# Patient Record
Sex: FEMALE | Race: BLACK OR AFRICAN AMERICAN
[De-identification: names, ages, dates, MRNs, and addresses within clinical notes are randomized per-mention and may not be internally consistent; named-entity substitution may affect disease eponyms.]

---

## 2020-06-04 ENCOUNTER — HOSPITAL ENCOUNTER (EMERGENCY)
Dept: HOSPITAL 72 - EMR | Age: 40
Discharge: HOME | End: 2020-06-04
Payer: COMMERCIAL

## 2020-06-04 VITALS — SYSTOLIC BLOOD PRESSURE: 141 MMHG | DIASTOLIC BLOOD PRESSURE: 97 MMHG

## 2020-06-04 VITALS — HEIGHT: 64 IN | WEIGHT: 213 LBS | BODY MASS INDEX: 36.37 KG/M2

## 2020-06-04 VITALS — DIASTOLIC BLOOD PRESSURE: 74 MMHG | SYSTOLIC BLOOD PRESSURE: 131 MMHG

## 2020-06-04 DIAGNOSIS — M76.61: Primary | ICD-10-CM

## 2020-06-04 DIAGNOSIS — J45.909: ICD-10-CM

## 2020-06-04 PROCEDURE — 96372 THER/PROPH/DIAG INJ SC/IM: CPT

## 2020-06-04 PROCEDURE — 73610 X-RAY EXAM OF ANKLE: CPT

## 2020-06-04 PROCEDURE — 99283 EMERGENCY DEPT VISIT LOW MDM: CPT

## 2020-06-04 NOTE — EMERGENCY ROOM REPORT
History of Present Illness


General


Chief Complaint:  Pain


Source:  Patient





Present Illness


HPI


This patient states that for the past month she has noted that her right 

Achilles tendon has been sore.  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 

job and this is the foot she uses to step on the gas.  She does a lot of 

stopping and starting.  She states that this has been ongoing for weeks.  She 

states it is aggravated by movement.  She is also noted some slight swelling 

around the tendon and in the ankle.  She denies injury or trauma.  She denies 

recent illness.  She denies fever or chills.  She denies cough or congestion.  

She has no other joint pain other than some chronic pain in her knees.  She has 

no other complaints.


Allergies:  


Coded Allergies:  


     No Known Allergies (Unverified , 9/23/17)





COVID-19 Screening


Contact w/high risk pt:  No


Recent Travel to affected area:  No


Experienced COVID-19 symptoms?:  No


COVID-19 Testing performed PTA:  No





Patient History


Past Medical History:  see triage record, asthma, other - HLP


Social History:  Denies: smoking, alcohol use, drug use


Last Menstrual Period:  05/11/20


Pregnant Now:  No


Reviewed Nursing Documentation:  PMH: Agreed; PSxH: Agreed





Nursing Documentation-PMH


Past Medical History:  No History, Except For


Hx Cardiac Problems:  No


Hx Asthma:  Yes


Hx Cancer:  No


Hx Gastrointestinal Problems:  No


Hx Neurological Problems:  No





Review of Systems


All Other Systems:  negative except mentioned in HPI





Physical Exam





Vital Signs








  Date Time  Temp Pulse Resp B/P (MAP) Pulse Ox O2 Delivery O2 Flow Rate FiO2


 


6/4/20 09:51 98.8 68 16 141/97 (112) 98 Room Air  








Sp02 EP Interpretation:  reviewed, normal


General Appearance:  no apparent distress, alert, GCS 15, non-toxic


Head:  normocephalic, atraumatic


ENT:  hearing grossly normal, no angioedema, normal voice


Neck:  normal inspection


Respiratory:  no respiratory distress, no retraction, no accessory muscle use, 

speaking full sentences


Rectal:  deferred


Musculoskeletal:  back normal, normal range of motion, other - Antalgic gait.  +

ttp along the R. achilles tendon with associated minimal/mild swelling.  No 

erythema or warmth.


Neurologic:  alert, motor strength/tone normal, oriented x3, sensory intact, 

responsive, speech normal


Psychiatric:  judgement/insight normal, memory normal, mood/affect normal, no 

suicidal/homicidal ideation


Skin:  no rash, normal color





Medical Decision Making


Diagnostic Impression:  


 Primary Impression:  


 Achilles tendinitis


ER Course


This patient has findings of Achilles tendinitis on exam.  The patient has pain 

with palpation of the Achilles tendon.  There is some mild associated swelling.

  There is no erythema or warmth that would make me concerned for a septic 

joint.  The Achilles tendon is intact and not ruptured.  The patient was given 

IM Toradol.  A plain film x-ray was obtained that showed no acute findings of 

the right ankle.  An Ace wrap was placed for comfort.  The patient was educated 

that she would need to follow-up with a podiatrist or orthopedist for further 

evaluation of her Achilles tendinitis.  I did not identify any emergency 

medical condition and do not feel that any further evaluation in the emergency 

department is indicated.  However, I did educate the patient that she would 

need to be seen further by 1 of these specialists as mentioned.  She indicated 

understanding and intention to do so.  She states she can easily follow-up with 

her primary care physician and obtain this referral.  She is given close return 

precautions and follow-up instructions.





I warned the patient that plain x-rays have a significant false-negative rate.  

Factors, significant soft tissue injuries, and other pathology may be present 

even though not seen on x-ray.  Injuries serious enough to ultimately require 

surgery may be present with normal x-rays.  This can occur because some 

fractures or not initially visible on plain film x-rays or, rarely, the 

radiologist may discover a subtle fracture that I missed on my preliminary 

read.  It was explained that close outpatient followup is required to evaluate 

this possibility.  If all symptoms resolve or improve significantly in the 

coming weeks, no further testing is needed.  However, if the pain/symptoms 

persist, additional studies such as MRI/CT or repeat x-ray would be needed to 

rule out the possibility of serious soft tissue injury.  The patient agreed to 

followup as directed.


Other X-Ray Diagnostic Results


Other X-Ray Diagnostic Results :  


   X-Ray ordered:  R. ankle xray


   # of Views/Limited Vs Complete:  Complete


   Indication:  Pain


   EP Interpretation:  Yes


   Interpretation:  no fractures


   Impression:  No acute disease


   Electronically Signed by:  Kiara Gomez DO





Last Vital Signs








  Date Time  Temp Pulse Resp B/P (MAP) Pulse Ox O2 Delivery O2 Flow Rate FiO2


 


6/4/20 09:53 98.8 68 16 141/97 98 Room Air  








Status:  improved


Disposition:  HOME, SELF-CARE


Condition:  Improved


Referrals:  


PREFERRED [facility],REFERRING (PCP)











Kiara Gomez DO Jun 4, 2020 10:55

## 2020-06-04 NOTE — DIAGNOSTIC IMAGING REPORT
Indication: Right ankle pain

 

Technique: 3 views of the right ankle

 

Comparison: none 

 

Findings: No acute fractures. No dislocations. The joint spaces are preserved.

 

Impression: Negative

## 2020-09-02 ENCOUNTER — HOSPITAL ENCOUNTER (EMERGENCY)
Dept: HOSPITAL 72 - EMR | Age: 40
Discharge: HOME | End: 2020-09-02
Payer: COMMERCIAL

## 2020-09-02 VITALS — WEIGHT: 201 LBS | HEIGHT: 64 IN | BODY MASS INDEX: 34.31 KG/M2

## 2020-09-02 VITALS — DIASTOLIC BLOOD PRESSURE: 86 MMHG | SYSTOLIC BLOOD PRESSURE: 130 MMHG

## 2020-09-02 VITALS — SYSTOLIC BLOOD PRESSURE: 130 MMHG | DIASTOLIC BLOOD PRESSURE: 86 MMHG

## 2020-09-02 DIAGNOSIS — M75.22: Primary | ICD-10-CM

## 2020-09-02 DIAGNOSIS — J45.909: ICD-10-CM

## 2020-09-02 PROCEDURE — 99283 EMERGENCY DEPT VISIT LOW MDM: CPT

## 2020-09-02 PROCEDURE — 73030 X-RAY EXAM OF SHOULDER: CPT

## 2020-09-02 NOTE — NUR
ED Nurse Note: Pt walked in from home c/o worsening left shoulder pain x 1 
month. Pt denies injury but c/o intermittent numbness in left hand. 
Respirations even and unlabored on room air. Vitals stable as documented. 
A+Ox4, speaking in full sentences.

## 2020-09-02 NOTE — EMERGENCY ROOM REPORT
History of Present Illness


General


Chief Complaint:  Pain


Source:  Patient





Present Illness


HPI


39-year-old female with no significant past medical history here complaining of 

over 1 month of left shoulder pain rating it 7 out of 10 with range of motion.  

Patient reports that she has been drives with left hand and shoulder.  

Complains of intermittent tingling.  Denies any fall or injury.  No impingement 

sign noted.  Patient is neurovascularly intact.  Has not taken medication for 

symptom relief.  Denies any chest pain chest pain radiation.  Denies headache 

and dizziness.  Denies any strenuous physical activity.  Denies pregnancy.


Allergies:  


Coded Allergies:  


     No Known Allergies (Unverified , 9/23/17)





COVID-19 Screening


Contact w/high risk pt:  No


Recent Travel to affected area:  No


Experienced COVID-19 symptoms?:  No


COVID-19 Testing performed PTA:  Yes - 2 weeks ago


COVID-19 Screening:  Negative COVID-19


COVID-19 Testing Source:  mouth





Patient History


Past Medical History:  see triage record


Past Surgical History:  none


Pertinent Family History:  none


Pregnant Now:  No


Immunizations:  UTD


Reviewed Nursing Documentation:  PMH: Agreed; PSxH: Agreed





Nursing Documentation-PMH


Past Medical History:  No History, Except For


Hx Cardiac Problems:  No


Hx Asthma:  Yes


Hx Cancer:  No


Hx Gastrointestinal Problems:  No


Hx Neurological Problems:  No





Review of Systems


All Other Systems:  negative except mentioned in HPI





Physical Exam





Vital Signs








  Date Time  Temp Pulse Resp B/P (MAP) Pulse Ox O2 Delivery O2 Flow Rate FiO2


 


9/2/20 13:56 98.4 63 16 130/86 (101) 99 Room Air  








Sp02 EP Interpretation:  reviewed, normal


General Appearance:  no apparent distress, alert, GCS 15, non-toxic


Head:  normocephalic, atraumatic


Eyes:  bilateral eye normal inspection, bilateral eye PERRL


ENT:  hearing grossly normal, normal pharynx, no angioedema, normal voice


Neck:  full range of motion, supple/symm/no masses


Respiratory:  chest non-tender, lungs clear, normal breath sounds, no rhonchi, 

no respiratory distress, speaking full sentences


Cardiovascular #1:  regular rate, rhythm, no edema


Cardiovascular #2:  2+ carotid (R), 2+ carotid (L), 2+ radial (R), 2+ radial (L)

, 2+ dorsalis pedis (R), 2+ dorsalis pedis (L)


Gastrointestinal:  normal bowel sounds, non tender, soft, non-distended, no 

guarding, no rebound


Rectal:  deferred


Genitourinary:  no CVA tenderness


Musculoskeletal:  back normal, no calf tenderness, non-tender, other - no 

inpingment sign


Neurologic:  alert, motor strength/tone normal, oriented x3, sensory intact, 

responsive, speech normal


Psychiatric:  judgement/insight normal, memory normal, mood/affect normal, no 

suicidal/homicidal ideation


Skin:  no rash


Lymphatic:  no adenopathy





Medical Decision Making


PA Attestation


All diagnoses and treatment plans were reviewed and discussed with my 

supervising physician Dr. Mendez


Diagnostic Impression:  


 Primary Impression:  


 Shoulder tendinitis


ER Course


39-year-old female with no significant past medical history here complaining of 

over 1 month of left shoulder pain rating it 7 out of 10 with range of motion.  

Patient reports that she has been drives with left hand and shoulder.  

Complains of intermittent tingling.  Denies any fall or injury.  No impingement 

sign noted.  Patient is neurovascularly intact.  Has not taken medication for 

symptom relief.  Denies any chest pain chest pain radiation.  Denies headache 

and dizziness.  Denies any strenuous physical activity.  Denies pregnancy.





Ddx considered but are not limited to : Shoulder strain versus fracture versus 

sprain versus tendinitis versus rotator cuff injury


Vital signs: are WNL, pt. is afebrile





H&PE are most consistent with: Shoulder tendinitis





ORDERS: Left shoulder x-ray, Robaxin, Motrin, lidocaine patch


ED INTERVENTIONS: Reports any pain medication helps








DISCHARGE: At this time pt. is stable for d/c to home. Will provide printed 

patient care instructions, and any necessary prescriptions. Care plan and 

follow up instructions have been discussed with the patient prior to discharge.

  Patient take medication as directed, follow-up primary care provider, if 

worsening symptoms return to the emergency room


Other X-Ray Diagnostic Results


Other X-Ray Diagnostic Results :  


   X-Ray ordered:  left shoulder


   # of Views/Limited Vs Complete:  3 View


   Indication:  Pain


   EP Interpretation:  Yes


   PA Xray:  Interpretation reviewed, by supervising MD, and agrees with 

findings.


   Interpretation:  no dislocation, no soft tissue swelling, no fractures


   Impression:  No acute disease


   Electronically Signed by:  Abi Richardson PA-C





Last Vital Signs








  Date Time  Temp Pulse Resp B/P (MAP) Pulse Ox O2 Delivery O2 Flow Rate FiO2


 


9/2/20 13:56 98.4 63 16 130/86 (101) 99 Room Air  








Disposition:  HOME, SELF-CARE


Condition:  Stable


Patient Instructions:  Bicipital Tendonitis





Additional Instructions:  


Patient take medication as directed, follow-up primary care provider, if 

worsening symptoms return to the emergency room











Abi Clancy Sep 2, 2020 14:24

## 2020-09-02 NOTE — DIAGNOSTIC IMAGING REPORT
Indication: Shoulder pain

 

Technique: 3 views of the left shoulder

 

Comparison: none

 

Findings: No acute fractures. No dislocations. The joint spaces are preserved

 

Impression: Negative